# Patient Record
Sex: MALE | Race: WHITE | Employment: FULL TIME | URBAN - METROPOLITAN AREA
[De-identification: names, ages, dates, MRNs, and addresses within clinical notes are randomized per-mention and may not be internally consistent; named-entity substitution may affect disease eponyms.]

---

## 2021-09-29 ENCOUNTER — OFFICE VISIT (OUTPATIENT)
Dept: PRIMARY CARE CLINIC | Age: 31
End: 2021-09-29

## 2021-09-29 VITALS
DIASTOLIC BLOOD PRESSURE: 60 MMHG | HEART RATE: 78 BPM | WEIGHT: 175.6 LBS | OXYGEN SATURATION: 97 % | TEMPERATURE: 97.5 F | SYSTOLIC BLOOD PRESSURE: 110 MMHG

## 2021-09-29 DIAGNOSIS — Z20.822 EXPOSURE TO COVID-19 VIRUS: ICD-10-CM

## 2021-09-29 DIAGNOSIS — R43.0 LOSS OF SMELL: ICD-10-CM

## 2021-09-29 DIAGNOSIS — R51.9 ACUTE NONINTRACTABLE HEADACHE, UNSPECIFIED HEADACHE TYPE: ICD-10-CM

## 2021-09-29 DIAGNOSIS — R43.2 LOSS OF TASTE: Primary | ICD-10-CM

## 2021-09-29 DIAGNOSIS — R05.8 DRY COUGH: ICD-10-CM

## 2021-09-29 LAB
Lab: NORMAL
PERFORMING INSTRUMENT: NORMAL
QC PASS/FAIL: NORMAL
SARS-COV-2, POC: NORMAL

## 2021-09-29 PROCEDURE — 99213 OFFICE O/P EST LOW 20 MIN: CPT | Performed by: NURSE PRACTITIONER

## 2021-09-29 PROCEDURE — 87426 SARSCOV CORONAVIRUS AG IA: CPT | Performed by: NURSE PRACTITIONER

## 2021-09-29 NOTE — PROGRESS NOTES
Chief Complaint   Concern For COVID-19 (loss of taste and smell x 1 day  stuffy nose, non-prod. cough x 3 days )      History of Present Illness   Source of history provided by:  patient. Cassia Damon is a 36 y.o. old male who presents to the walk in clinic with complaints of Headache, Nasal Congestion, dry, non-productive Cough, Patient was exposed to someone who is a known Covid-19 infected person or directly caring for such person and loss of smell and taste x 2 days. States symptoms have stayed the same since onset. Has been taking no OTC medications. Denies any Fever, Shortness of breath, Nausea, Vomiting, Chest Pain, LE Edema, Abdominal Pain, Rash or Lethargy. Denies any hx of asthma, COPD, emphysema or pneumonia. ROS   Pertinent positives and negatives are stated within HPI, all other systems reviewed and are negative. Past Medical History:  has no past medical history on file. Past Surgical History:  has no past surgical history on file. Social History:  reports that he has never smoked. He has never used smokeless tobacco.  Family History: family history is not on file. Allergies: Patient has no known allergies. Physical Exam   Vital Signs:  /60   Pulse 78   Temp 97.5 °F (36.4 °C) (Temporal)   Wt 175 lb 9.6 oz (79.7 kg)   SpO2 97%    Oxygen Saturation Interpretation: Normal.    Constitutional:  Alert, development consistent with age. NAD. Head:  NC/NT. Airway patent. Ears: TMs clear bilaterally. Canals without exudate or swelling bilaterally. Mouth: Posterior pharynx with no erythema or postnasal drip. No tonsillar hypertrophy or exudate. Neck:  Normal ROM. Supple. No anterior cervical adenopathy noted. Lungs: CTAB without wheezes, rales, or rhonchi. CV:  Regular rate and rhythm, normal heart sounds, without pathological murmurs, ectopy, gallops, or rubs. Skin:  Normal turgor. Warm, dry, without visible rash.   Lymphatic: No lymphangitis or adenopathy noted.  Neurological:  Oriented. Motor functions intact. Lab / Imaging Results   (All laboratory and radiology results have been personally reviewed by myself)  Labs:  Results for orders placed or performed in visit on 09/29/21   POCT COVID-19, Antigen   Result Value Ref Range    SARS-COV-2, POC Not-Detected Not Detected    Lot Number [id number]     QC Pass/Fail pass     Performing Instrument BD Veritor        Imaging: All Radiology results interpreted by Radiologist unless otherwise noted. No results found. Medical Decision Making   Pt non-toxic, in no apparent distress and stable at time of discharge. Assessment/Plan   Mackenzie Sánchez was seen today for concern for covid-19. Diagnoses and all orders for this visit:    Loss of taste  -     POCT COVID-19, Antigen    Loss of smell  -     POCT COVID-19, Antigen    Acute nonintractable headache, unspecified headache type    Dry cough    Exposure to COVID-19 virus        Rapid COVID-19 swab obtained and noted to be negative. Work excuse provided to patient today. Increase fluids and rest. Symptomatic relief discussed including Tylenol prn pain/fever. Schedule f/u with PCP in 7-10 days if symptoms persist. ED sooner if symptoms worsen or change. ED immediately with high or refractory fever, progressive SOB, dyspnea, CP, calf pain/swelling, shaking chills, vomiting, abdominal pain, lethargy, flank pain, or decreased urinary output. Pt verbalizes understanding and is in agreement with plan of care. All questions answered. MAUREEN uRiz - NP    This visit was provided as a focused evaluation during the [place] -19 pandemic/national emergency. A comprehensive review of all previous patient history and testing was not conducted. Pertinent findings were elicited during the visit. *NOTE: This report was transcribed using voice recognition software.  Every effort was made to ensure accuracy; however, inadvertent computerized transcription errors may be present.